# Patient Record
Sex: FEMALE
[De-identification: names, ages, dates, MRNs, and addresses within clinical notes are randomized per-mention and may not be internally consistent; named-entity substitution may affect disease eponyms.]

---

## 2022-06-10 ENCOUNTER — APPOINTMENT (OUTPATIENT)
Dept: ORTHOPEDIC SURGERY | Facility: CLINIC | Age: 27
End: 2022-06-10
Payer: COMMERCIAL

## 2022-06-10 VITALS — HEIGHT: 62 IN | BODY MASS INDEX: 25.58 KG/M2 | WEIGHT: 139 LBS

## 2022-06-10 PROBLEM — Z00.00 ENCOUNTER FOR PREVENTIVE HEALTH EXAMINATION: Status: ACTIVE | Noted: 2022-06-10

## 2022-06-10 PROCEDURE — 99214 OFFICE O/P EST MOD 30 MIN: CPT

## 2022-06-22 ENCOUNTER — FORM ENCOUNTER (OUTPATIENT)
Age: 27
End: 2022-06-22

## 2022-06-23 ENCOUNTER — FORM ENCOUNTER (OUTPATIENT)
Age: 27
End: 2022-06-23

## 2022-06-24 NOTE — IMAGING
[de-identified] : Left middle finger with palpable pea-sized mass at digitopalmar crease. +ttp. Able to flex and extend at MCP, PIP and DIP. Sensation intact throughout. <2sec cap refill.\par \par Left middle finger radiographs from outside demonstrate no fracture nor dislocation. No osseous abnormality.

## 2022-06-24 NOTE — HISTORY OF PRESENT ILLNESS
[de-identified] : 27F, RHD, No medication. Patient has a cyst in the left hand located on the middle finger. Patient claims it started 2 weeks ago. Pain is described 7/10 and has discomfort. Denies numbness/tingling. Admits to having a cyst in the same spot approx 4 years ago. Patient states they drained it the first time.

## 2022-06-24 NOTE — ASSESSMENT
[FreeTextEntry1] : Left middle finger mass at digitopalmar crease - reviewed pathoanatomy with patient. In light of recurrence of mass and continuing growth, will obtain left middle finger MRI without contrast. Will followup thereafter to review results and develop plan of care.\par \par F/u after MRI

## 2022-07-15 ENCOUNTER — APPOINTMENT (OUTPATIENT)
Dept: MRI IMAGING | Facility: CLINIC | Age: 27
End: 2022-07-15

## 2022-07-15 PROCEDURE — 73218 MRI UPPER EXTREMITY W/O DYE: CPT | Mod: LT

## 2022-07-21 ENCOUNTER — FORM ENCOUNTER (OUTPATIENT)
Age: 27
End: 2022-07-21

## 2022-08-05 ENCOUNTER — APPOINTMENT (OUTPATIENT)
Dept: ORTHOPEDIC SURGERY | Facility: CLINIC | Age: 27
End: 2022-08-05

## 2022-08-05 VITALS — WEIGHT: 139 LBS | HEIGHT: 62 IN | BODY MASS INDEX: 25.58 KG/M2

## 2022-08-05 DIAGNOSIS — Z78.9 OTHER SPECIFIED HEALTH STATUS: ICD-10-CM

## 2022-08-05 PROCEDURE — 99214 OFFICE O/P EST MOD 30 MIN: CPT | Mod: 57

## 2022-08-05 RX ORDER — AZITHROMYCIN 250 MG/1
250 TABLET, FILM COATED ORAL
Qty: 6 | Refills: 0 | Status: DISCONTINUED | COMMUNITY
Start: 2022-05-31

## 2022-08-05 RX ORDER — DOXYCYCLINE HYCLATE 100 MG/1
100 TABLET ORAL
Qty: 28 | Refills: 0 | Status: DISCONTINUED | COMMUNITY
Start: 2022-07-08

## 2022-08-05 RX ORDER — MUPIROCIN 20 MG/G
2 OINTMENT TOPICAL
Qty: 22 | Refills: 0 | Status: DISCONTINUED | COMMUNITY
Start: 2022-06-10

## 2022-08-05 RX ORDER — MONTELUKAST 10 MG/1
10 TABLET, FILM COATED ORAL
Qty: 30 | Refills: 0 | Status: DISCONTINUED | COMMUNITY
Start: 2022-07-07

## 2022-08-05 RX ORDER — FLUTICASONE PROPIONATE 50 UG/1
50 SPRAY, METERED NASAL
Qty: 16 | Refills: 0 | Status: DISCONTINUED | COMMUNITY
Start: 2022-06-10

## 2022-08-08 NOTE — HISTORY OF PRESENT ILLNESS
[de-identified] : 27F, RHD, No medication. Patient has a cyst in the left hand located on the middle finger. Patient claims it started 2 weeks ago. Pain is described 7/10 and has discomfort. Denies numbness/tingling. Admits to having a cyst in the same spot approx 4 years ago. Patient states they drained it the first time. \par \par 8/5/22: f/u left hand middle finger. Admits to pain still, denies numbness/tingling. Admits to growing since last seen.

## 2022-08-08 NOTE — IMAGING
[de-identified] : Left middle finger with palpable pea-sized mass at digitopalmar crease. +ttp. Able to flex and extend at MCP, PIP and DIP. Sensation intact throughout. <2sec cap refill.\par \par Left middle finger radiographs from outside demonstrate no fracture nor dislocation. No osseous abnormality.\par Left middle finger MRI with evidence of retinacular cyst at volar P1 base.

## 2022-08-08 NOTE — REASON FOR VISIT
[FreeTextEntry2] : MRI L RING FINGER IMPRESSION:\par IMPRESSION:\par 1. 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

## 2022-08-08 NOTE — ASSESSMENT
[FreeTextEntry1] : Left middle finger retinacular cyst - reviewed MRI and discussed with patient this is likely a retinacular cyst or ganglion, possibly connected to pulley system or flexor tendon. Patient explains this has interfered with everything she does day to day and wants it removed. Discussed risks/benefits and alterative including nonoperative management. Risks include pain, stiffness, infection, recurrence, neurovascular injury. Patient understood this conversation, and wants to proceed with excision of mass and A1 pulley release of ring finger.\par \par Plan for left middle finger retinacular cyst excision and A1 pulley release under local + MAC. PJASC.\par \par F/u for surgery

## 2022-09-21 ENCOUNTER — APPOINTMENT (OUTPATIENT)
Dept: ORTHOPEDIC SURGERY | Facility: AMBULATORY SURGERY CENTER | Age: 27
End: 2022-09-21

## 2022-09-21 PROCEDURE — 26160 REMOVE TENDON SHEATH LESION: CPT | Mod: F2

## 2022-09-21 PROCEDURE — 26055 INCISE FINGER TENDON SHEATH: CPT | Mod: 59,F2

## 2022-10-03 ENCOUNTER — APPOINTMENT (OUTPATIENT)
Dept: ORTHOPEDIC SURGERY | Facility: CLINIC | Age: 27
End: 2022-10-03

## 2022-10-03 PROCEDURE — 99024 POSTOP FOLLOW-UP VISIT: CPT

## 2022-10-04 NOTE — IMAGING
[de-identified] : Left middle finger with well healed volar incision - no erythema nor drainage. Able to gently flex and extend at MCP, PIP and DIP. Sensation intact throughout - tingling at tip of MF. <2sec cap refill.\par \par Left middle finger radiographs from outside demonstrate no fracture nor dislocation. No osseous abnormality.\par Left middle finger MRI with evidence of retinacular cyst at volar P1 base.

## 2022-10-04 NOTE — HISTORY OF PRESENT ILLNESS
[de-identified] : 27F, RHD, No medication. Patient has a cyst in the left hand located on the middle finger. Patient claims it started 2 weeks ago. Pain is described 7/10 and has discomfort. Denies numbness/tingling. Admits to having a cyst in the same spot approx 4 years ago. Patient states they drained it the first time. \par \par 8/5/22: f/u left hand middle finger. Admits to pain still, denies numbness/tingling. Admits to growing since last seen. \par \par 10/3/22: s/p left hand middle finger mass excision and A1 pulley release [9/21/22]. Patient reports having some numbness/tingling, difficulty with extension. Admits to some discomfort. No constitutional symptoms.

## 2022-10-04 NOTE — ASSESSMENT
[FreeTextEntry1] : s/p left middle finger mass excision and A1 pulley release [9/21/22] - progressing well postop. OT for ROM and scar massage.\par \par Sutures removed, patient tolerated well.\par \par F/u 4 weeks

## 2022-11-04 ENCOUNTER — APPOINTMENT (OUTPATIENT)
Dept: ORTHOPEDIC SURGERY | Facility: CLINIC | Age: 27
End: 2022-11-04

## 2022-11-04 PROCEDURE — 99024 POSTOP FOLLOW-UP VISIT: CPT

## 2022-11-04 NOTE — IMAGING
[de-identified] : Left middle finger with well healed volar incision - no erythema nor drainage. Able to flex and extend at MCP, PIP and DIP. Sensation intact throughout - tingling at tip of MF. Unable to fully extend, FDS becomes prominent at palm, appreciable scar tissue at incision - sensitive.  <2sec cap refill.\par \par Left middle finger radiographs from outside demonstrate no fracture nor dislocation. No osseous abnormality.\par Left middle finger MRI with evidence of retinacular cyst at volar P1 base.

## 2022-11-04 NOTE — ASSESSMENT
[FreeTextEntry1] : s/p left middle finger mass excision and A1 pulley release [9/21/22] - progressing well postop. OT for ROM and scar massage.\par \par F/u 6 weeks (consider MRI to assess; could consider tenolysis, as if FDS scarred to overlying dermis)

## 2022-11-04 NOTE — HISTORY OF PRESENT ILLNESS
[de-identified] : 27F, RHD, No medication. Patient has a cyst in the left hand located on the middle finger. Patient claims it started 2 weeks ago. Pain is described 7/10 and has discomfort. Denies numbness/tingling. Admits to having a cyst in the same spot approx 4 years ago. Patient states they drained it the first time. \par \par 8/5/22: f/u left hand middle finger. Admits to pain still, denies numbness/tingling. Admits to growing since last seen. \par \par 10/3/22: s/p left hand middle finger mass excision and A1 pulley release [9/21/22]. Patient reports having some numbness/tingling, difficulty with extension. Admits to some discomfort. No constitutional symptoms.\par \par 11/4/22: s/p left hand middle finger mass excison and a1 pulley release [9/21/22]. OT going well, difficulty with full extension, feels a tightness. Denies numbness/tingling. Feels something hard forming near incision site. Patient has been using KT tape to help with pain.

## 2022-11-11 ENCOUNTER — APPOINTMENT (OUTPATIENT)
Dept: ORTHOPEDIC SURGERY | Facility: CLINIC | Age: 27
End: 2022-11-11

## 2022-11-20 ENCOUNTER — FORM ENCOUNTER (OUTPATIENT)
Age: 27
End: 2022-11-20

## 2022-12-16 ENCOUNTER — APPOINTMENT (OUTPATIENT)
Dept: ORTHOPEDIC SURGERY | Facility: CLINIC | Age: 27
End: 2022-12-16

## 2022-12-16 DIAGNOSIS — R22.32 LOCALIZED SWELLING, MASS AND LUMP, LEFT UPPER LIMB: ICD-10-CM

## 2022-12-16 PROCEDURE — 99024 POSTOP FOLLOW-UP VISIT: CPT

## 2022-12-16 NOTE — ASSESSMENT
[FreeTextEntry1] : s/p left middle finger mass excision and A1 pulley release [9/21/22] - progressing well postop. OT for ROM and scar massage. Improving. Will hold off on MRI at this time.\par \par F/u 6 weeks (consider MRI to assess; could consider tenolysis, as if FDS scarred to overlying dermis)

## 2022-12-16 NOTE — IMAGING
[de-identified] : Left middle finger with well healed volar incision - no erythema nor drainage. Able to flex and extend at MCP, PIP and DIP. Sensation intact throughout - no further tingling at tip of MF. Unable to fully extend by 5-10 degrees, FDS less prominent at palm, appreciable scar tissue at incision - sensitive.  <2sec cap refill.\par \par Left middle finger radiographs from outside demonstrate no fracture nor dislocation. No osseous abnormality.\par Left middle finger MRI with evidence of retinacular cyst at volar P1 base.

## 2022-12-16 NOTE — HISTORY OF PRESENT ILLNESS
[de-identified] : 27F, RHD, No medication. Patient has a cyst in the left hand located on the middle finger. Patient claims it started 2 weeks ago. Pain is described 7/10 and has discomfort. Denies numbness/tingling. Admits to having a cyst in the same spot approx 4 years ago. Patient states they drained it the first time. \par \par 8/5/22: f/u left hand middle finger. Admits to pain still, denies numbness/tingling. Admits to growing since last seen. \par \par 10/3/22: s/p left hand middle finger mass excision and A1 pulley release [9/21/22]. Patient reports having some numbness/tingling, difficulty with extension. Admits to some discomfort. No constitutional symptoms.\par \par 11/4/22: s/p left hand middle finger mass excison and a1 pulley release [9/21/22]. OT going well, difficulty with full extension, feels a tightness. Denies numbness/tingling. Feels something hard forming near incision site. Patient has been using KT tape to help with pain. \par \par 12/16/22: s/p left hand middle finger mass excision and A1 pulley release [9/21/22]. Reports PT not improving symptoms - has not gone for 2 weeks. Reports having some stiffness, difficulty with grasping and swelling. Doing HEP. Reports difficulty with applying pressure - will have shooting pain.

## 2023-04-12 ENCOUNTER — APPOINTMENT (OUTPATIENT)
Dept: ORTHOPEDIC SURGERY | Facility: CLINIC | Age: 28
End: 2023-04-12
Payer: COMMERCIAL

## 2023-04-12 PROCEDURE — 20611 DRAIN/INJ JOINT/BURSA W/US: CPT | Mod: RT

## 2023-04-12 PROCEDURE — 99214 OFFICE O/P EST MOD 30 MIN: CPT | Mod: 25

## 2023-04-12 PROCEDURE — L1812: CPT | Mod: RT

## 2023-04-12 PROCEDURE — S0020: CPT | Mod: RT

## 2023-04-12 NOTE — PROCEDURE
[Large Joint Injection] : Large joint injection [Right] : of the right [Knee] : knee [Pain] : pain [Inflammation] : inflammation [X-ray evidence of Osteoarthritis on this or prior visit] : x-ray evidence of Osteoarthritis on this or prior visit [Alcohol] : alcohol [Betadine] : betadine [Ethyl Chloride sprayed topically] : ethyl chloride sprayed topically [___ cc    6mg] :  Betamethasone (Celestone) ~Vcc of 6mg [___ cc    0.25%] : Bupivacaine (Marcaine) ~Vcc of 0.25%  [] : Patient tolerated procedure well [Call if redness, pain or fever occur] : call if redness, pain or fever occur [Apply ice for 15min out of every hour for the next 12-24 hours as tolerated] : apply ice for 15 minutes out of every hour for the next 12-24 hours as tolerated [Patient was advised to rest the joint(s) for ____ days] : patient was advised to rest the joint(s) for [unfilled] days [Previous OTC use and PT nontherapeutic] : patient has tried OTC's including aspirin, Ibuprofen, Aleve, etc or prescription NSAIDS, and/or exercises at home and/or physical therapy without satisfactory response [Patient had decreased mobility in the joint] : patient had decreased mobility in the joint [Risks, benefits, alternatives discussed / Verbal consent obtained] : the risks benefits, and alternatives have been discussed, and verbal consent was obtained [Altered anatomic landmarks d/t erosive arthritis] : altered anatomic landmarks d/t erosive arthritis [All ultrasound images have been permanently captured and stored accordingly in our picture archiving and communication system] : All ultrasound images have been permanently captured and stored accordingly in our picture archiving and communication system [Visualization of the needle and placement of injection was performed without complication] : visualization of the needle and placement of injection was performed without complication

## 2023-04-12 NOTE — PHYSICAL EXAM
[4___] : hamstring 4[unfilled]/5 [] : patient ambulates without assistive device [Right] : right knee [Lateral] : lateral [[place]] : skyline [AP Standing] : anteroposterior standing [There are no fractures, subluxations or dislocations. No significant abnormalities are seen] : There are no fractures, subluxations or dislocations. No significant abnormalities are seen [FreeTextEntry9] : x-rays from last year reviewed [TWNoteComboBox7] : flexion 120 degrees [de-identified] : extension 0 degrees

## 2023-04-12 NOTE — HISTORY OF PRESENT ILLNESS
[de-identified] : Patient states that her pain in her right knee has returned, she has no new injuries. She has pain when squatting at the gym and feels popping and clicking. The injection that she had last year helped her up until recently.

## 2023-05-24 ENCOUNTER — APPOINTMENT (OUTPATIENT)
Dept: ORTHOPEDIC SURGERY | Facility: CLINIC | Age: 28
End: 2023-05-24
Payer: COMMERCIAL

## 2023-05-24 DIAGNOSIS — S83.511A SPRAIN OF ANTERIOR CRUCIATE LIGAMENT OF RIGHT KNEE, INITIAL ENCOUNTER: ICD-10-CM

## 2023-05-24 DIAGNOSIS — S83.221A PERIPHERAL TEAR OF MEDIAL MENISCUS, CURRENT INJURY, RIGHT KNEE, INITIAL ENCOUNTER: ICD-10-CM

## 2023-05-24 PROCEDURE — 99213 OFFICE O/P EST LOW 20 MIN: CPT

## 2023-05-24 NOTE — PHYSICAL EXAM
[4___] : hamstring 4[unfilled]/5 [Right] : right knee [Lateral] : lateral [[place]] : skyline [AP Standing] : anteroposterior standing [There are no fractures, subluxations or dislocations. No significant abnormalities are seen] : There are no fractures, subluxations or dislocations. No significant abnormalities are seen [Positive] : positive Huma [] : no extensor lag [FreeTextEntry9] : x-rays from last year reviewed [TWNoteComboBox7] : flexion 120 degrees [de-identified] : extension 0 degrees

## 2023-05-24 NOTE — HISTORY OF PRESENT ILLNESS
[de-identified] : Patient reports no improvement with the injection and she is having persistent stiffness and pain in the posterior knee.

## 2023-06-06 ENCOUNTER — FORM ENCOUNTER (OUTPATIENT)
Age: 28
End: 2023-06-06

## 2023-06-07 ENCOUNTER — APPOINTMENT (OUTPATIENT)
Dept: MRI IMAGING | Facility: CLINIC | Age: 28
End: 2023-06-07
Payer: COMMERCIAL

## 2023-06-07 PROCEDURE — 73721 MRI JNT OF LWR EXTRE W/O DYE: CPT | Mod: RT

## 2023-07-05 ENCOUNTER — APPOINTMENT (OUTPATIENT)
Dept: ORTHOPEDIC SURGERY | Facility: CLINIC | Age: 28
End: 2023-07-05
Payer: COMMERCIAL

## 2023-07-05 DIAGNOSIS — M94.261 CHONDROMALACIA, RIGHT KNEE: ICD-10-CM

## 2023-07-05 DIAGNOSIS — S83.411A SPRAIN OF MEDIAL COLLATERAL LIGAMENT OF RIGHT KNEE, INITIAL ENCOUNTER: ICD-10-CM

## 2023-07-05 PROCEDURE — 99213 OFFICE O/P EST LOW 20 MIN: CPT

## 2023-07-05 PROCEDURE — 99212 OFFICE O/P EST SF 10 MIN: CPT

## 2023-07-05 NOTE — DATA REVIEWED
[MRI] : MRI [Right] : of the right [Knee] : knee [FreeTextEntry1] : 1. ACL mucoid change.\par 2. MCL sprain at the femur.\par 3. Patella deepti. Joint effusion and infrapatellar soft tissue edema, which can be seen with patellofemoral\par impingement syndrome.

## 2023-07-05 NOTE — PHYSICAL EXAM
[4___] : hamstring 4[unfilled]/5 [Positive] : positive Huma [Right] : right knee [Lateral] : lateral [[place]] : skyline [AP Standing] : anteroposterior standing [There are no fractures, subluxations or dislocations. No significant abnormalities are seen] : There are no fractures, subluxations or dislocations. No significant abnormalities are seen [] : no extensor lag [FreeTextEntry9] : x-rays from last year reviewed [TWNoteComboBox7] : flexion 120 degrees [de-identified] : extension 0 degrees

## 2023-09-06 ENCOUNTER — APPOINTMENT (OUTPATIENT)
Dept: ORTHOPEDIC SURGERY | Facility: CLINIC | Age: 28
End: 2023-09-06

## 2024-11-25 ENCOUNTER — APPOINTMENT (OUTPATIENT)
Dept: ORTHOPEDIC SURGERY | Facility: CLINIC | Age: 29
End: 2024-11-25

## 2024-11-25 DIAGNOSIS — M94.261 CHONDROMALACIA, RIGHT KNEE: ICD-10-CM

## 2024-11-25 PROCEDURE — L1812: CPT | Mod: RT,KX

## 2024-11-25 PROCEDURE — 73560 X-RAY EXAM OF KNEE 1 OR 2: CPT | Mod: RT

## 2024-11-25 PROCEDURE — 99213 OFFICE O/P EST LOW 20 MIN: CPT | Mod: 25

## 2024-11-25 PROCEDURE — 73565 X-RAY EXAM OF KNEES: CPT

## 2024-11-25 RX ORDER — CELECOXIB 200 MG/1
200 CAPSULE ORAL
Qty: 30 | Refills: 1 | Status: ACTIVE | COMMUNITY
Start: 2024-11-25 | End: 1900-01-01

## 2025-01-29 ENCOUNTER — APPOINTMENT (OUTPATIENT)
Dept: ORTHOPEDIC SURGERY | Facility: CLINIC | Age: 30
End: 2025-01-29
Payer: COMMERCIAL

## 2025-01-29 DIAGNOSIS — M94.261 CHONDROMALACIA, RIGHT KNEE: ICD-10-CM

## 2025-01-29 PROCEDURE — 99213 OFFICE O/P EST LOW 20 MIN: CPT

## 2025-02-03 ENCOUNTER — APPOINTMENT (OUTPATIENT)
Dept: ORTHOPEDIC SURGERY | Facility: CLINIC | Age: 30
End: 2025-02-03
Payer: COMMERCIAL

## 2025-02-03 DIAGNOSIS — M17.11 UNILATERAL PRIMARY OSTEOARTHRITIS, RIGHT KNEE: ICD-10-CM

## 2025-02-03 PROCEDURE — 20611 DRAIN/INJ JOINT/BURSA W/US: CPT | Mod: RT

## 2025-03-26 ENCOUNTER — APPOINTMENT (OUTPATIENT)
Dept: ORTHOPEDIC SURGERY | Facility: CLINIC | Age: 30
End: 2025-03-26